# Patient Record
Sex: MALE | Race: BLACK OR AFRICAN AMERICAN | Employment: FULL TIME | URBAN - METROPOLITAN AREA
[De-identification: names, ages, dates, MRNs, and addresses within clinical notes are randomized per-mention and may not be internally consistent; named-entity substitution may affect disease eponyms.]

---

## 2023-08-23 ENCOUNTER — HOSPITAL ENCOUNTER (INPATIENT)
Age: 28
LOS: 4 days | Discharge: HOME | DRG: 241 | End: 2023-08-27
Attending: EMERGENCY MEDICINE | Admitting: FAMILY MEDICINE
Payer: MEDICAID

## 2023-08-23 DIAGNOSIS — E86.1 HYPOTENSION DUE TO HYPOVOLEMIA: ICD-10-CM

## 2023-08-23 DIAGNOSIS — R55 SYNCOPE AND COLLAPSE: ICD-10-CM

## 2023-08-23 DIAGNOSIS — K92.2 ACUTE UPPER GI BLEED: Primary | ICD-10-CM

## 2023-08-23 DIAGNOSIS — I95.89 HYPOTENSION DUE TO HYPOVOLEMIA: ICD-10-CM

## 2023-08-23 PROBLEM — K92.0 HEMATEMESIS: Status: ACTIVE | Noted: 2023-08-23

## 2023-08-23 PROBLEM — E66.01 CLASS 2 SEVERE OBESITY DUE TO EXCESS CALORIES WITH SERIOUS COMORBIDITY AND BODY MASS INDEX (BMI) OF 38.0 TO 38.9 IN ADULT (HCC): Chronic | Status: ACTIVE | Noted: 2023-08-23

## 2023-08-23 LAB
ABO + RH BLD: NORMAL
ALBUMIN SERPL-MCNC: 3.3 G/DL (ref 3.5–5)
ALBUMIN/GLOB SERPL: 0.9 (ref 0.4–1.6)
ALP SERPL-CCNC: 45 U/L (ref 50–136)
ALT SERPL-CCNC: 30 U/L (ref 12–65)
AMPHET UR QL SCN: NEGATIVE
ANION GAP SERPL CALC-SCNC: 6 MMOL/L (ref 2–11)
APTT PPP: 26.1 SEC (ref 24.5–34.2)
AST SERPL-CCNC: 14 U/L (ref 15–37)
BARBITURATES UR QL SCN: NEGATIVE
BASOPHILS # BLD: 0.1 K/UL (ref 0–0.2)
BASOPHILS NFR BLD: 0 % (ref 0–2)
BENZODIAZ UR QL: NEGATIVE
BILIRUB SERPL-MCNC: 0.6 MG/DL (ref 0.2–1.1)
BLOOD GROUP ANTIBODIES SERPL: NORMAL
BUN SERPL-MCNC: 37 MG/DL (ref 6–23)
CALCIUM SERPL-MCNC: 8.7 MG/DL (ref 8.3–10.4)
CANNABINOIDS UR QL SCN: POSITIVE
CHLORIDE SERPL-SCNC: 112 MMOL/L (ref 101–110)
CO2 SERPL-SCNC: 25 MMOL/L (ref 21–32)
COCAINE UR QL SCN: NEGATIVE
CREAT SERPL-MCNC: 1.21 MG/DL (ref 0.8–1.5)
DIFFERENTIAL METHOD BLD: ABNORMAL
EKG ATRIAL RATE: 84 BPM
EKG DIAGNOSIS: NORMAL
EKG P AXIS: 56 DEGREES
EKG P-R INTERVAL: 149 MS
EKG Q-T INTERVAL: 368 MS
EKG QRS DURATION: 91 MS
EKG QTC CALCULATION (BAZETT): 435 MS
EKG R AXIS: 39 DEGREES
EKG T AXIS: 54 DEGREES
EKG VENTRICULAR RATE: 84 BPM
EOSINOPHIL # BLD: 0.1 K/UL (ref 0–0.8)
EOSINOPHIL NFR BLD: 0 % (ref 0.5–7.8)
ERYTHROCYTE [DISTWIDTH] IN BLOOD BY AUTOMATED COUNT: 13.7 % (ref 11.9–14.6)
ETHANOL SERPL-MCNC: <3 MG/DL (ref 0–0.08)
GLOBULIN SER CALC-MCNC: 3.5 G/DL (ref 2.8–4.5)
GLUCOSE SERPL-MCNC: 131 MG/DL (ref 65–100)
HCT VFR BLD AUTO: 38.8 % (ref 41.1–50.3)
HGB BLD-MCNC: 13.2 G/DL (ref 13.6–17.2)
IMM GRANULOCYTES # BLD AUTO: 0.1 K/UL (ref 0–0.5)
IMM GRANULOCYTES NFR BLD AUTO: 1 % (ref 0–5)
INR PPP: 1.2
LACTATE SERPL-SCNC: 1.4 MMOL/L (ref 0.4–2)
LIPASE SERPL-CCNC: 140 U/L (ref 73–393)
LYMPHOCYTES # BLD: 2.7 K/UL (ref 0.5–4.6)
LYMPHOCYTES NFR BLD: 23 % (ref 13–44)
MCH RBC QN AUTO: 30.8 PG (ref 26.1–32.9)
MCHC RBC AUTO-ENTMCNC: 34 G/DL (ref 31.4–35)
MCV RBC AUTO: 90.7 FL (ref 82–102)
METHADONE UR QL: NEGATIVE
MONOCYTES # BLD: 0.8 K/UL (ref 0.1–1.3)
MONOCYTES NFR BLD: 7 % (ref 4–12)
NEUTS SEG # BLD: 8.4 K/UL (ref 1.7–8.2)
NEUTS SEG NFR BLD: 69 % (ref 43–78)
NRBC # BLD: 0 K/UL (ref 0–0.2)
OPIATES UR QL: NEGATIVE
PCP UR QL: NEGATIVE
PLATELET # BLD AUTO: 225 K/UL (ref 150–450)
PMV BLD AUTO: 10.7 FL (ref 9.4–12.3)
POTASSIUM SERPL-SCNC: 4.4 MMOL/L (ref 3.5–5.1)
PROT SERPL-MCNC: 6.8 G/DL (ref 6.3–8.2)
PROTHROMBIN TIME: 14.9 SEC (ref 12.6–14.3)
RBC # BLD AUTO: 4.28 M/UL (ref 4.23–5.6)
SODIUM SERPL-SCNC: 143 MMOL/L (ref 133–143)
SPECIMEN EXP DATE BLD: NORMAL
WBC # BLD AUTO: 12.1 K/UL (ref 4.3–11.1)

## 2023-08-23 PROCEDURE — 96361 HYDRATE IV INFUSION ADD-ON: CPT

## 2023-08-23 PROCEDURE — 85610 PROTHROMBIN TIME: CPT

## 2023-08-23 PROCEDURE — 87209 SMEAR COMPLEX STAIN: CPT

## 2023-08-23 PROCEDURE — 80053 COMPREHEN METABOLIC PANEL: CPT

## 2023-08-23 PROCEDURE — 2580000003 HC RX 258: Performed by: EMERGENCY MEDICINE

## 2023-08-23 PROCEDURE — 87046 STOOL CULTR AEROBIC BACT EA: CPT

## 2023-08-23 PROCEDURE — 87427 SHIGA-LIKE TOXIN AG IA: CPT

## 2023-08-23 PROCEDURE — 99221 1ST HOSP IP/OBS SF/LOW 40: CPT | Performed by: INTERNAL MEDICINE

## 2023-08-23 PROCEDURE — 2580000003 HC RX 258: Performed by: FAMILY MEDICINE

## 2023-08-23 PROCEDURE — 87077 CULTURE AEROBIC IDENTIFY: CPT

## 2023-08-23 PROCEDURE — 96375 TX/PRO/DX INJ NEW DRUG ADDON: CPT

## 2023-08-23 PROCEDURE — C9113 INJ PANTOPRAZOLE SODIUM, VIA: HCPCS | Performed by: EMERGENCY MEDICINE

## 2023-08-23 PROCEDURE — 87177 OVA AND PARASITES SMEARS: CPT

## 2023-08-23 PROCEDURE — 6360000002 HC RX W HCPCS: Performed by: INTERNAL MEDICINE

## 2023-08-23 PROCEDURE — A4216 STERILE WATER/SALINE, 10 ML: HCPCS | Performed by: INTERNAL MEDICINE

## 2023-08-23 PROCEDURE — 83605 ASSAY OF LACTIC ACID: CPT

## 2023-08-23 PROCEDURE — 2580000003 HC RX 258: Performed by: INTERNAL MEDICINE

## 2023-08-23 PROCEDURE — 94762 N-INVAS EAR/PLS OXIMTRY CONT: CPT

## 2023-08-23 PROCEDURE — A4216 STERILE WATER/SALINE, 10 ML: HCPCS | Performed by: EMERGENCY MEDICINE

## 2023-08-23 PROCEDURE — 6360000002 HC RX W HCPCS: Performed by: EMERGENCY MEDICINE

## 2023-08-23 PROCEDURE — 81001 URINALYSIS AUTO W/SCOPE: CPT

## 2023-08-23 PROCEDURE — 87324 CLOSTRIDIUM AG IA: CPT

## 2023-08-23 PROCEDURE — 85025 COMPLETE CBC W/AUTO DIFF WBC: CPT

## 2023-08-23 PROCEDURE — 86901 BLOOD TYPING SEROLOGIC RH(D): CPT

## 2023-08-23 PROCEDURE — 93005 ELECTROCARDIOGRAM TRACING: CPT | Performed by: EMERGENCY MEDICINE

## 2023-08-23 PROCEDURE — 85730 THROMBOPLASTIN TIME PARTIAL: CPT

## 2023-08-23 PROCEDURE — 80307 DRUG TEST PRSMV CHEM ANLYZR: CPT

## 2023-08-23 PROCEDURE — 87086 URINE CULTURE/COLONY COUNT: CPT

## 2023-08-23 PROCEDURE — C9113 INJ PANTOPRAZOLE SODIUM, VIA: HCPCS | Performed by: INTERNAL MEDICINE

## 2023-08-23 PROCEDURE — 96374 THER/PROPH/DIAG INJ IV PUSH: CPT

## 2023-08-23 PROCEDURE — 87899 AGENT NOS ASSAY W/OPTIC: CPT

## 2023-08-23 PROCEDURE — 86900 BLOOD TYPING SEROLOGIC ABO: CPT

## 2023-08-23 PROCEDURE — 6370000000 HC RX 637 (ALT 250 FOR IP): Performed by: FAMILY MEDICINE

## 2023-08-23 PROCEDURE — 87045 FECES CULTURE AEROBIC BACT: CPT

## 2023-08-23 PROCEDURE — 86850 RBC ANTIBODY SCREEN: CPT

## 2023-08-23 PROCEDURE — 1100000000 HC RM PRIVATE

## 2023-08-23 PROCEDURE — 83690 ASSAY OF LIPASE: CPT

## 2023-08-23 PROCEDURE — 99285 EMERGENCY DEPT VISIT HI MDM: CPT

## 2023-08-23 PROCEDURE — 82077 ASSAY SPEC XCP UR&BREATH IA: CPT

## 2023-08-23 PROCEDURE — 87449 NOS EACH ORGANISM AG IA: CPT

## 2023-08-23 RX ORDER — SUCRALFATE 1 G/1
1 TABLET ORAL EVERY 8 HOURS SCHEDULED
Status: DISCONTINUED | OUTPATIENT
Start: 2023-08-23 | End: 2023-08-27 | Stop reason: HOSPADM

## 2023-08-23 RX ORDER — ONDANSETRON 2 MG/ML
4 INJECTION INTRAMUSCULAR; INTRAVENOUS ONCE
Status: COMPLETED | OUTPATIENT
Start: 2023-08-23 | End: 2023-08-23

## 2023-08-23 RX ORDER — SODIUM CHLORIDE, SODIUM LACTATE, POTASSIUM CHLORIDE, CALCIUM CHLORIDE 600; 310; 30; 20 MG/100ML; MG/100ML; MG/100ML; MG/100ML
INJECTION, SOLUTION INTRAVENOUS CONTINUOUS
Status: ACTIVE | OUTPATIENT
Start: 2023-08-23 | End: 2023-08-24

## 2023-08-23 RX ORDER — 0.9 % SODIUM CHLORIDE 0.9 %
1000 INTRAVENOUS SOLUTION INTRAVENOUS ONCE
Status: COMPLETED | OUTPATIENT
Start: 2023-08-23 | End: 2023-08-23

## 2023-08-23 RX ORDER — SUCRALFATE 1 G/1
TABLET ORAL
Status: DISPENSED
Start: 2023-08-23 | End: 2023-08-24

## 2023-08-23 RX ORDER — SODIUM CHLORIDE, SODIUM LACTATE, POTASSIUM CHLORIDE, AND CALCIUM CHLORIDE .6; .31; .03; .02 G/100ML; G/100ML; G/100ML; G/100ML
500 INJECTION, SOLUTION INTRAVENOUS ONCE
Status: COMPLETED | OUTPATIENT
Start: 2023-08-23 | End: 2023-08-23

## 2023-08-23 RX ADMIN — SODIUM CHLORIDE 1000 ML: 9 INJECTION, SOLUTION INTRAVENOUS at 11:34

## 2023-08-23 RX ADMIN — SODIUM CHLORIDE, PRESERVATIVE FREE 40 MG: 5 INJECTION INTRAVENOUS at 21:45

## 2023-08-23 RX ADMIN — SUCRALFATE 1 G: 1 TABLET ORAL at 21:45

## 2023-08-23 RX ADMIN — SODIUM CHLORIDE, SODIUM LACTATE, POTASSIUM CHLORIDE, AND CALCIUM CHLORIDE 500 ML: 600; 310; 30; 20 INJECTION, SOLUTION INTRAVENOUS at 14:15

## 2023-08-23 RX ADMIN — SODIUM CHLORIDE, POTASSIUM CHLORIDE, SODIUM LACTATE AND CALCIUM CHLORIDE: 600; 310; 30; 20 INJECTION, SOLUTION INTRAVENOUS at 15:13

## 2023-08-23 RX ADMIN — SUCRALFATE 1 G: 1 TABLET ORAL at 16:37

## 2023-08-23 RX ADMIN — SODIUM CHLORIDE 40 MG: 9 INJECTION INTRAMUSCULAR; INTRAVENOUS; SUBCUTANEOUS at 11:35

## 2023-08-23 RX ADMIN — ONDANSETRON 4 MG: 2 INJECTION INTRAMUSCULAR; INTRAVENOUS at 11:35

## 2023-08-23 ASSESSMENT — ENCOUNTER SYMPTOMS
DIARRHEA: 0
SHORTNESS OF BREATH: 0
NAUSEA: 1
COUGH: 0
BLOOD IN STOOL: 1
VOMITING: 1
ABDOMINAL PAIN: 1
BACK PAIN: 0
SORE THROAT: 0

## 2023-08-23 ASSESSMENT — PAIN - FUNCTIONAL ASSESSMENT: PAIN_FUNCTIONAL_ASSESSMENT: 0-10

## 2023-08-23 ASSESSMENT — PAIN SCALES - GENERAL: PAINLEVEL_OUTOF10: 0

## 2023-08-23 NOTE — H&P
143 133 - 143 mmol/L    Potassium 4.4 3.5 - 5.1 mmol/L    Chloride 112 (H) 101 - 110 mmol/L    CO2 25 21 - 32 mmol/L    Anion Gap 6 2 - 11 mmol/L    Glucose 131 (H) 65 - 100 mg/dL    BUN 37 (H) 6 - 23 MG/DL    Creatinine 1.21 0.8 - 1.5 MG/DL    Est, Glom Filt Rate >60 >60 ml/min/1.73m2    Calcium 8.7 8.3 - 10.4 MG/DL    Total Bilirubin 0.6 0.2 - 1.1 MG/DL    ALT 30 12 - 65 U/L    AST 14 (L) 15 - 37 U/L    Alk Phosphatase 45 (L) 50 - 136 U/L    Total Protein 6.8 6.3 - 8.2 g/dL    Albumin 3.3 (L) 3.5 - 5.0 g/dL    Globulin 3.5 2.8 - 4.5 g/dL    Albumin/Globulin Ratio 0.9 0.4 - 1.6     Lipase    Collection Time: 08/23/23 11:17 AM   Result Value Ref Range    Lipase 140 73 - 393 U/L   APTT    Collection Time: 08/23/23 11:17 AM   Result Value Ref Range    PTT 26.1 24.5 - 34.2 SEC   Protime-INR    Collection Time: 08/23/23 11:17 AM   Result Value Ref Range    Protime 14.9 (H) 12.6 - 14.3 sec    INR 1.2     Ethanol    Collection Time: 08/23/23 11:17 AM   Result Value Ref Range    Ethanol Lvl <3 MG/DL   Lactate, Sepsis    Collection Time: 08/23/23 11:30 AM   Result Value Ref Range    Lactic Acid, Sepsis 1.4 0.4 - 2.0 MMOL/L   TYPE AND SCREEN    Collection Time: 08/23/23 11:30 AM   Result Value Ref Range    Crossmatch expiration date 08/26/2023,9489     ABO/Rh A POSITIVE     Antibody Screen NEG        I have personally reviewed imaging studies:  No results found.       Signed:  Trisha Caldera MD

## 2023-08-23 NOTE — ED PROVIDER NOTES
Urine Drug Screen    Ethanol    CBC    Diet NPO    ADULT DIET;  Regular    Cardiac Monitor - ED Only    Continuous Pulse Oximetry    Inpatient consult to GI    EKG 12 Lead    TYPE AND SCREEN    Saline lock IV    ADMIT TO INPATIENT       Critical Care  Performed by: Natalia Jean MD  Authorized by: Wanda Hylton MD     Critical care provider statement:     Critical care time (minutes):  32    Critical care time was exclusive of:  Separately billable procedures and treating other patients    Critical care was necessary to treat or prevent imminent or life-threatening deterioration of the following conditions:  Circulatory failure and shock    Critical care was time spent personally by me on the following activities:  Development of treatment plan with patient or surrogate, discussions with consultants, discussions with primary provider, evaluation of patient's response to treatment, examination of patient, obtaining history from patient or surrogate, ordering and review of laboratory studies, ordering and performing treatments and interventions and re-evaluation of patient's condition    I assumed direction of critical care for this patient from another provider in my specialty: no      Care discussed with: admitting provider      Results Include:    Recent Results (from the past 24 hour(s))   EKG 12 Lead    Collection Time: 08/23/23 11:15 AM   Result Value Ref Range    Ventricular Rate 84 BPM    Atrial Rate 84 BPM    P-R Interval 149 ms    QRS Duration 91 ms    Q-T Interval 368 ms    QTc Calculation (Bazett) 435 ms    P Axis 56 degrees    R Axis 39 degrees    T Axis 54 degrees    Diagnosis       Sinus rhythm  Normal ECG    Confirmed by MD JONNY, Corby Hart ([id number]) on 8/23/2023 1:33:13 PM     CBC with Auto Differential    Collection Time: 08/23/23 11:17 AM   Result Value Ref Range    WBC 12.1 (H) 4.3 - 11.1 K/uL    RBC 4.28 4.23 - 5.6 M/uL    Hemoglobin 13.2 (L) 13.6 - 17.2 g/dL    Hematocrit 38.8 (L) 41.1 - 50.3 %

## 2023-08-23 NOTE — ED TRIAGE NOTES
Patient brought in by EMS coming from home. EMS states that patient had LOC this morning at 8 am. States emesis with black blood clots. EMS states patient also had black tarry stools this morning. NS given through L AC, 200 Ml. With EMS patient became lethargic.      70/42 L arm BP  150/70 R arm

## 2023-08-23 NOTE — ED NOTES
TRANSFER - OUT REPORT:    Verbal report given to Celina on Burnstephanie Batista  being transferred to [address] [place] [address] Drive [phone]) for routine progression of patient care       Report consisted of patient's Situation, Background, Assessment and   Recommendations(SBAR). Information from the following report(s) Nurse Handoff Report was reviewed with the receiving nurse. Kinder Fall Assessment:                           Lines:   Peripheral IV 08/23/23 Left Antecubital (Active)        Opportunity for questions and clarification was provided.       Patient transported with:  Patient transporter          Rajendra Rodgers RN  08/23/23 3198

## 2023-08-24 ENCOUNTER — ANESTHESIA EVENT (OUTPATIENT)
Dept: ENDOSCOPY | Age: 28
End: 2023-08-24
Payer: MEDICAID

## 2023-08-24 ENCOUNTER — ANESTHESIA (OUTPATIENT)
Dept: ENDOSCOPY | Age: 28
End: 2023-08-24
Payer: MEDICAID

## 2023-08-24 PROBLEM — K92.2 ACUTE UPPER GI BLEED: Status: ACTIVE | Noted: 2023-08-24

## 2023-08-24 LAB
APPEARANCE UR: CLEAR
BACTERIA URNS QL MICRO: NEGATIVE /HPF
BILIRUB UR QL: NEGATIVE
CASTS URNS QL MICRO: ABNORMAL /LPF
COLOR UR: ABNORMAL
EPI CELLS #/AREA URNS HPF: ABNORMAL /HPF
ERYTHROCYTE [DISTWIDTH] IN BLOOD BY AUTOMATED COUNT: 13.9 % (ref 11.9–14.6)
GLUCOSE UR STRIP.AUTO-MCNC: NEGATIVE MG/DL
HCT VFR BLD AUTO: 29.9 % (ref 41.1–50.3)
HGB BLD-MCNC: 10 G/DL (ref 13.6–17.2)
HGB UR QL STRIP: NEGATIVE
KETONES UR QL STRIP.AUTO: NEGATIVE MG/DL
LEUKOCYTE ESTERASE UR QL STRIP.AUTO: ABNORMAL
MCH RBC QN AUTO: 30.8 PG (ref 26.1–32.9)
MCHC RBC AUTO-ENTMCNC: 33.4 G/DL (ref 31.4–35)
MCV RBC AUTO: 92 FL (ref 82–102)
MUCOUS THREADS URNS QL MICRO: 0 /LPF
NITRITE UR QL STRIP.AUTO: NEGATIVE
NRBC # BLD: 0 K/UL (ref 0–0.2)
PH UR STRIP: 6.5 (ref 5–9)
PLATELET # BLD AUTO: 184 K/UL (ref 150–450)
PMV BLD AUTO: 11 FL (ref 9.4–12.3)
PROT UR STRIP-MCNC: NEGATIVE MG/DL
RBC # BLD AUTO: 3.25 M/UL (ref 4.23–5.6)
RBC #/AREA URNS HPF: ABNORMAL /HPF
SP GR UR REFRACTOMETRY: 1.03 (ref 1–1.02)
URINE CULTURE IF INDICATED: ABNORMAL
UROBILINOGEN UR QL STRIP.AUTO: 0.2 EU/DL (ref 0.2–1)
WBC # BLD AUTO: 8.7 K/UL (ref 4.3–11.1)
WBC URNS QL MICRO: ABNORMAL /HPF

## 2023-08-24 PROCEDURE — 6360000002 HC RX W HCPCS: Performed by: NURSE ANESTHETIST, CERTIFIED REGISTERED

## 2023-08-24 PROCEDURE — 2709999900 HC NON-CHARGEABLE SUPPLY: Performed by: INTERNAL MEDICINE

## 2023-08-24 PROCEDURE — 6360000002 HC RX W HCPCS: Performed by: FAMILY MEDICINE

## 2023-08-24 PROCEDURE — 2500000003 HC RX 250 WO HCPCS: Performed by: NURSE ANESTHETIST, CERTIFIED REGISTERED

## 2023-08-24 PROCEDURE — 2580000003 HC RX 258: Performed by: INTERNAL MEDICINE

## 2023-08-24 PROCEDURE — 85027 COMPLETE CBC AUTOMATED: CPT

## 2023-08-24 PROCEDURE — A4216 STERILE WATER/SALINE, 10 ML: HCPCS | Performed by: INTERNAL MEDICINE

## 2023-08-24 PROCEDURE — 3E0G8GC INTRODUCTION OF OTHER THERAPEUTIC SUBSTANCE INTO UPPER GI, VIA NATURAL OR ARTIFICIAL OPENING ENDOSCOPIC: ICD-10-PCS | Performed by: INTERNAL MEDICINE

## 2023-08-24 PROCEDURE — 6370000000 HC RX 637 (ALT 250 FOR IP): Performed by: FAMILY MEDICINE

## 2023-08-24 PROCEDURE — C9113 INJ PANTOPRAZOLE SODIUM, VIA: HCPCS | Performed by: INTERNAL MEDICINE

## 2023-08-24 PROCEDURE — 2720000010 HC SURG SUPPLY STERILE: Performed by: INTERNAL MEDICINE

## 2023-08-24 PROCEDURE — 88305 TISSUE EXAM BY PATHOLOGIST: CPT

## 2023-08-24 PROCEDURE — 3700000001 HC ADD 15 MINUTES (ANESTHESIA): Performed by: INTERNAL MEDICINE

## 2023-08-24 PROCEDURE — 6360000002 HC RX W HCPCS: Performed by: INTERNAL MEDICINE

## 2023-08-24 PROCEDURE — 2580000003 HC RX 258: Performed by: FAMILY MEDICINE

## 2023-08-24 PROCEDURE — C1889 IMPLANT/INSERT DEVICE, NOC: HCPCS | Performed by: INTERNAL MEDICINE

## 2023-08-24 PROCEDURE — 7100000000 HC PACU RECOVERY - FIRST 15 MIN: Performed by: INTERNAL MEDICINE

## 2023-08-24 PROCEDURE — 3700000000 HC ANESTHESIA ATTENDED CARE: Performed by: INTERNAL MEDICINE

## 2023-08-24 PROCEDURE — 2000000000 HC ICU R&B

## 2023-08-24 PROCEDURE — 3609012300 HC EGD BAND LIGATION ESOPHGEAL/GASTRIC VARICES: Performed by: INTERNAL MEDICINE

## 2023-08-24 PROCEDURE — 0W3P8ZZ CONTROL BLEEDING IN GASTROINTESTINAL TRACT, VIA NATURAL OR ARTIFICIAL OPENING ENDOSCOPIC: ICD-10-PCS | Performed by: INTERNAL MEDICINE

## 2023-08-24 PROCEDURE — 36415 COLL VENOUS BLD VENIPUNCTURE: CPT

## 2023-08-24 PROCEDURE — 7100000001 HC PACU RECOVERY - ADDTL 15 MIN: Performed by: INTERNAL MEDICINE

## 2023-08-24 PROCEDURE — 88312 SPECIAL STAINS GROUP 1: CPT

## 2023-08-24 RX ORDER — PROPOFOL 10 MG/ML
INJECTION, EMULSION INTRAVENOUS PRN
Status: DISCONTINUED | OUTPATIENT
Start: 2023-08-24 | End: 2023-08-24 | Stop reason: SDUPTHER

## 2023-08-24 RX ORDER — ENOXAPARIN SODIUM 100 MG/ML
30 INJECTION SUBCUTANEOUS 2 TIMES DAILY
Status: DISCONTINUED | OUTPATIENT
Start: 2023-08-24 | End: 2023-08-27 | Stop reason: HOSPADM

## 2023-08-24 RX ORDER — ONDANSETRON 4 MG/1
4 TABLET, ORALLY DISINTEGRATING ORAL EVERY 8 HOURS PRN
Status: DISCONTINUED | OUTPATIENT
Start: 2023-08-24 | End: 2023-08-27 | Stop reason: HOSPADM

## 2023-08-24 RX ORDER — SODIUM CHLORIDE 0.9 % (FLUSH) 0.9 %
5-40 SYRINGE (ML) INJECTION EVERY 12 HOURS SCHEDULED
Status: DISCONTINUED | OUTPATIENT
Start: 2023-08-24 | End: 2023-08-27 | Stop reason: HOSPADM

## 2023-08-24 RX ORDER — ACETAMINOPHEN 650 MG/1
650 SUPPOSITORY RECTAL EVERY 6 HOURS PRN
Status: DISCONTINUED | OUTPATIENT
Start: 2023-08-24 | End: 2023-08-27 | Stop reason: HOSPADM

## 2023-08-24 RX ORDER — ACETAMINOPHEN 325 MG/1
650 TABLET ORAL EVERY 6 HOURS PRN
Status: DISCONTINUED | OUTPATIENT
Start: 2023-08-24 | End: 2023-08-27 | Stop reason: HOSPADM

## 2023-08-24 RX ORDER — POTASSIUM CHLORIDE 7.45 MG/ML
10 INJECTION INTRAVENOUS PRN
Status: DISCONTINUED | OUTPATIENT
Start: 2023-08-24 | End: 2023-08-27 | Stop reason: HOSPADM

## 2023-08-24 RX ORDER — ONDANSETRON 2 MG/ML
INJECTION INTRAMUSCULAR; INTRAVENOUS PRN
Status: DISCONTINUED | OUTPATIENT
Start: 2023-08-24 | End: 2023-08-24 | Stop reason: SDUPTHER

## 2023-08-24 RX ORDER — SODIUM CHLORIDE 9 MG/ML
INJECTION, SOLUTION INTRAVENOUS PRN
Status: DISCONTINUED | OUTPATIENT
Start: 2023-08-24 | End: 2023-08-27 | Stop reason: HOSPADM

## 2023-08-24 RX ORDER — FENTANYL CITRATE 50 UG/ML
INJECTION, SOLUTION INTRAMUSCULAR; INTRAVENOUS PRN
Status: DISCONTINUED | OUTPATIENT
Start: 2023-08-24 | End: 2023-08-24 | Stop reason: SDUPTHER

## 2023-08-24 RX ORDER — ONDANSETRON 2 MG/ML
4 INJECTION INTRAMUSCULAR; INTRAVENOUS EVERY 6 HOURS PRN
Status: DISCONTINUED | OUTPATIENT
Start: 2023-08-24 | End: 2023-08-27 | Stop reason: HOSPADM

## 2023-08-24 RX ORDER — POLYETHYLENE GLYCOL 3350 17 G/17G
17 POWDER, FOR SOLUTION ORAL DAILY PRN
Status: DISCONTINUED | OUTPATIENT
Start: 2023-08-24 | End: 2023-08-27 | Stop reason: HOSPADM

## 2023-08-24 RX ORDER — POTASSIUM CHLORIDE 20 MEQ/1
40 TABLET, EXTENDED RELEASE ORAL PRN
Status: DISCONTINUED | OUTPATIENT
Start: 2023-08-24 | End: 2023-08-27 | Stop reason: HOSPADM

## 2023-08-24 RX ORDER — SODIUM CHLORIDE 0.9 % (FLUSH) 0.9 %
5-40 SYRINGE (ML) INJECTION PRN
Status: DISCONTINUED | OUTPATIENT
Start: 2023-08-24 | End: 2023-08-27 | Stop reason: HOSPADM

## 2023-08-24 RX ORDER — LIDOCAINE HYDROCHLORIDE 20 MG/ML
INJECTION, SOLUTION EPIDURAL; INFILTRATION; INTRACAUDAL; PERINEURAL PRN
Status: DISCONTINUED | OUTPATIENT
Start: 2023-08-24 | End: 2023-08-24 | Stop reason: SDUPTHER

## 2023-08-24 RX ORDER — EPINEPHRINE 1 MG/ML(1)
AMPUL (ML) INJECTION PRN
Status: DISCONTINUED | OUTPATIENT
Start: 2023-08-24 | End: 2023-08-24 | Stop reason: ALTCHOICE

## 2023-08-24 RX ORDER — MAGNESIUM SULFATE IN WATER 40 MG/ML
2000 INJECTION, SOLUTION INTRAVENOUS PRN
Status: DISCONTINUED | OUTPATIENT
Start: 2023-08-24 | End: 2023-08-27 | Stop reason: HOSPADM

## 2023-08-24 RX ADMIN — SUCRALFATE 1 G: 1 TABLET ORAL at 21:57

## 2023-08-24 RX ADMIN — SODIUM CHLORIDE, PRESERVATIVE FREE 40 MG: 5 INJECTION INTRAVENOUS at 23:33

## 2023-08-24 RX ADMIN — ONDANSETRON 4 MG: 2 INJECTION INTRAMUSCULAR; INTRAVENOUS at 11:02

## 2023-08-24 RX ADMIN — PROPOFOL 50 MG: 10 INJECTION, EMULSION INTRAVENOUS at 10:37

## 2023-08-24 RX ADMIN — PROPOFOL 100 MCG/KG/MIN: 10 INJECTION, EMULSION INTRAVENOUS at 10:38

## 2023-08-24 RX ADMIN — PHENYLEPHRINE HYDROCHLORIDE 50 MCG: 0.1 INJECTION, SOLUTION INTRAVENOUS at 10:52

## 2023-08-24 RX ADMIN — SUCRALFATE 1 G: 1 TABLET ORAL at 14:58

## 2023-08-24 RX ADMIN — VANCOMYCIN HYDROCHLORIDE 125 MG: 5 INJECTION, POWDER, LYOPHILIZED, FOR SOLUTION INTRAVENOUS at 15:32

## 2023-08-24 RX ADMIN — SODIUM CHLORIDE, POTASSIUM CHLORIDE, SODIUM LACTATE AND CALCIUM CHLORIDE: 600; 310; 30; 20 INJECTION, SOLUTION INTRAVENOUS at 01:32

## 2023-08-24 RX ADMIN — SODIUM CHLORIDE, PRESERVATIVE FREE 40 MG: 5 INJECTION INTRAVENOUS at 12:39

## 2023-08-24 RX ADMIN — LIDOCAINE HYDROCHLORIDE 100 MG: 20 INJECTION, SOLUTION EPIDURAL; INFILTRATION; INTRACAUDAL; PERINEURAL at 10:42

## 2023-08-24 RX ADMIN — SODIUM CHLORIDE, PRESERVATIVE FREE 5 ML: 5 INJECTION INTRAVENOUS at 20:57

## 2023-08-24 RX ADMIN — FENTANYL CITRATE 50 MCG: 50 INJECTION, SOLUTION INTRAMUSCULAR; INTRAVENOUS at 10:44

## 2023-08-24 RX ADMIN — PHENYLEPHRINE HYDROCHLORIDE 50 MCG: 0.1 INJECTION, SOLUTION INTRAVENOUS at 10:56

## 2023-08-24 RX ADMIN — VANCOMYCIN HYDROCHLORIDE 125 MG: 5 INJECTION, POWDER, LYOPHILIZED, FOR SOLUTION INTRAVENOUS at 23:34

## 2023-08-24 RX ADMIN — FENTANYL CITRATE 50 MCG: 50 INJECTION, SOLUTION INTRAMUSCULAR; INTRAVENOUS at 10:45

## 2023-08-24 ASSESSMENT — PAIN SCALES - GENERAL
PAINLEVEL_OUTOF10: 0

## 2023-08-24 ASSESSMENT — LIFESTYLE VARIABLES: SMOKING_STATUS: 1

## 2023-08-24 NOTE — PERIOP NOTE
Dr. Dario Rhodes would like the patient to go to ICU for observation. Dr. Naomie Holland notified. Dr. Naomie Holland to contact Dr. Dario Rhodes. Dr. Naomei Holland to place the transfer order to ICU. Nursing supervisor notified and bed request placed.

## 2023-08-24 NOTE — PROGRESS NOTES
Hospitalist Progress Note   Admit Date:  2023 11:12 AM   Name:  Elías Reardon   Age:  28 y.o. Sex:  male  :  1995   MRN:  [id number]   Room:  ENDO/PL    Presenting/Chief Complaint: Loss of Consciousness and Hematemesis     Reason(s) for Admission: Hematemesis [K92.0]  Syncope and collapse [R55]  Acute upper GI bleed [K92.2]  Hypotension due to hypovolemia [I95.89, E86.1]     Hospital Course:   28 y.o. male with medical history of no significance who presented with awakened with rapid onset diarrhea and vomiting. He had eaten at [facility] and cook meals at home on the prior day. He awoke in feeling diaphoretic went to the bathroom was unable to make it to the toilet before he passed out and soiled himself. He noted bleeding with the vomiting. He does have a history of anterior approach surgery on the spine. He reports generalized resolution of diarrhea but feels very weak and tired. Subjective & 24hr Events:   Had EGD today. Discussed with gastroenterologist.  Considerable risk due to exposure blood vessel at the base of gastric erosion. H. pylori testing sent. Patient needs to remain in stepdown unit for 24 hours and then can be safely transferred to the floor but will need to be watched for at least 2 days. Patient feeling a little bit weak but overall better since admission. Interested in eating. Started clear liquid diet. Will monitor hemoglobin levels closely. Also notable C. difficile labs came back positive and patient was initiated on oral vancomycin. Plan discussed with patient's sister, mother and nurse, .     Assessment & Plan:   Hematemesis, gastric ulcer (high risk)  EGD with gastric ulcer above large vessel  -Consult gastroenterology  -Monitor in stepdown 24 hours  -Serial Hgb  -H. pylori labs  -Symptomatic management    C. difficile infection   C. difficile labs positive, diarrhea less today  -Vancomycin p.o. x7d    Class 2 severe obesity due to

## 2023-08-24 NOTE — ANESTHESIA POSTPROCEDURE EVALUATION
Department of Anesthesiology  Postprocedure Note    Patient: Stan Otto  MRN: [id number]  YOB: 1995  Date of evaluation: 8/24/2023      Procedure Summary     Date: 08/24/23 Room / Location: [facility] 01 / [facility] ENDOSCOPY    Anesthesia Start: 1025 Anesthesia Stop: 2768    Procedure: EGD BAND LIGATION/Bicap/injection/bxs (Upper GI Region) Diagnosis:       Hematemesis      (Hematemesis [K92.0])    Surgeons: Pio Schultz MD Responsible Provider: Angel Hinds MD    Anesthesia Type: TIVA ASA Status: 2          Anesthesia Type: No value filed.     Danyel Phase I: Danyel Score: 8    Danyel Phase II:        Anesthesia Post Evaluation    Patient location during evaluation: bedside  Patient participation: complete - patient participated  Level of consciousness: awake and alert  Airway patency: patent  Nausea & Vomiting: no vomiting  Complications: no  Cardiovascular status: hemodynamically stable  Respiratory status: acceptable  Hydration status: euvolemic  Pain management: adequate

## 2023-08-24 NOTE — PROGRESS NOTES
Hemoglobin dropped to 10 overnight. Will plan for diagnostic EGD this morning with Dr. Andre Reyes. Keep Npo.

## 2023-08-24 NOTE — OP NOTE
Endoscopy Note          Operative Report    Patient: Shahid Mendoza MRN: [id number]      YOB: 1995  Age: 28 y.o. Sex: male            Indications:  Hematemesis    Preoperative Evaluation: The patient was evaluated prior to the procedure in the GI lab admission area, the patient ASA was recorded . Consent was obtained from the patient with the risk of perforation bleeding and aspiration. Anesthesia: JAMES-per anesthesia    Complication: None; patient tolerated the procedure well. Estimated blood loss: insignificant    Procedure: The patient was sedated into the left lateral decubitus position. Scope was advanced from the mouth to the third portion of duodenum. Scope was withdrawn to the stomach and retroflexed view was performed. Esophageal examination was performed. Findings:    #1 antral ulcer with visible vessel status post epinephrine injection, gold probe cautery, and placement of 5 endoclips    2. Pinch biopsy taken of the gastric cavity work-up for possible H. pylori    Postoperative Diagnosis:    See above          Recommendations:    #1  Continue twice daily PPI    2. Based on the findings of an ulcer with visible vessel patient should be observed in a stepdown or ICU setting for the next 24 hours    3. Patient should remain in the hospital for approximately 3 days for close clinical observation    #4  Patient will need repeat upper endoscopy in 4 to 6 weeks    6. Follow-up biopsies to work-up for possible H. pylori    7. Avoid all nonsteroidal anti-inflammatory drugs as well as aspirin    8. Would initiate a clear liquid diet today    9. Serial H&H's and transfuse hemoglobin if less than 7.       Signed By:  Lea Dinh MD     August 24, 2023                     Operative Note      Patient: Shahid Mendoza  YOB: 1995  MRN: [id number]    Date of Procedure: 8/24/2023    Pre-Op Diagnosis Codes:     * Hematemesis [K92.0]    Post-Op Diagnosis: Same

## 2023-08-24 NOTE — PROGRESS NOTES
TRANSFER - IN REPORT:    Verbal report received from [place], [place]  on [employer]  being received from PACU for change in patient condition (s/p band ligation with visible vessels requiring 24h ICU observation )      Report consisted of patient's Situation, Background, Assessment and   Recommendations(SBAR). Information from the following report(s) Nurse Handoff Report, ED Encounter Summary, ED SBAR, Surgery Report, Intake/Output, Recent Results, and Cardiac Rhythm SR  was reviewed with the receiving nurse. Opportunity for questions and clarification was provided. Assessment completed upon patient's arrival to unit and care assumed.

## 2023-08-24 NOTE — PERIOP NOTE
TRANSFER - OUT REPORT:    Verbal report given to Marilou Duran on [place]  being transferred to [id number] for routine post-op       Report consisted of patient's Situation, Background, Assessment and   Recommendations(SBAR). Information from the following report(s) Nurse Handoff Report was reviewed with the receiving nurse. Lines:   Peripheral IV 08/23/23 Left Antecubital (Active)   Site Assessment Clean, dry & intact 08/24/23 1125   Line Status Capped;Flushed;Normal saline locked 08/24/23 1125   Line Care Connections checked and tightened 08/24/23 1125   Phlebitis Assessment No symptoms 08/24/23 1125   Infiltration Assessment 0 08/24/23 1125   Alcohol Cap Used No 08/24/23 1125   Dressing Status Clean, dry & intact 08/24/23 1125   Dressing Type Transparent 08/24/23 1125        Opportunity for questions and clarification was provided.       Patient transported with:  O2 @ room air lpm

## 2023-08-25 ENCOUNTER — APPOINTMENT (OUTPATIENT)
Dept: GENERAL RADIOLOGY | Age: 28
DRG: 241 | End: 2023-08-25
Payer: MEDICAID

## 2023-08-25 PROBLEM — D62 ACUTE BLOOD LOSS ANEMIA: Status: ACTIVE | Noted: 2023-08-25

## 2023-08-25 PROBLEM — A49.8 CLOSTRIDIOIDES DIFFICILE INFECTION: Status: ACTIVE | Noted: 2023-08-25

## 2023-08-25 LAB
ALBUMIN SERPL-MCNC: 2.8 G/DL (ref 3.5–5)
ANION GAP SERPL CALC-SCNC: 4 MMOL/L (ref 2–11)
BUN SERPL-MCNC: 17 MG/DL (ref 6–23)
CALCIUM SERPL-MCNC: 8.2 MG/DL (ref 8.3–10.4)
CHLORIDE SERPL-SCNC: 110 MMOL/L (ref 101–110)
CHOLEST SERPL-MCNC: 81 MG/DL
CO2 SERPL-SCNC: 28 MMOL/L (ref 21–32)
CREAT SERPL-MCNC: 0.91 MG/DL (ref 0.8–1.5)
ERYTHROCYTE [DISTWIDTH] IN BLOOD BY AUTOMATED COUNT: 13.9 % (ref 11.9–14.6)
EST. AVERAGE GLUCOSE BLD GHB EST-MCNC: 114 MG/DL
GLUCOSE SERPL-MCNC: 90 MG/DL (ref 65–100)
HBA1C MFR BLD: 5.6 % (ref 4.8–5.6)
HCT VFR BLD AUTO: 26 % (ref 41.1–50.3)
HCT VFR BLD AUTO: 27.1 % (ref 41.1–50.3)
HDLC SERPL-MCNC: 26 MG/DL (ref 40–60)
HDLC SERPL: 3.1
HGB BLD-MCNC: 8.6 G/DL (ref 13.6–17.2)
HGB BLD-MCNC: 8.9 G/DL (ref 13.6–17.2)
LDLC SERPL CALC-MCNC: 35.8 MG/DL
MCH RBC QN AUTO: 30.9 PG (ref 26.1–32.9)
MCHC RBC AUTO-ENTMCNC: 33.1 G/DL (ref 31.4–35)
MCV RBC AUTO: 93.5 FL (ref 82–102)
NRBC # BLD: 0 K/UL (ref 0–0.2)
PHOSPHATE SERPL-MCNC: 2.6 MG/DL (ref 2.5–4.5)
PLATELET # BLD AUTO: 171 K/UL (ref 150–450)
PMV BLD AUTO: 10.8 FL (ref 9.4–12.3)
POTASSIUM SERPL-SCNC: 3.6 MMOL/L (ref 3.5–5.1)
PROCALCITONIN SERPL-MCNC: <0.05 NG/ML (ref 0–0.49)
RBC # BLD AUTO: 2.78 M/UL (ref 4.23–5.6)
SODIUM SERPL-SCNC: 142 MMOL/L (ref 133–143)
TRIGL SERPL-MCNC: 96 MG/DL (ref 35–150)
TSH W FREE THYROID IF ABNORMAL: 0.95 UIU/ML (ref 0.36–3.74)
VLDLC SERPL CALC-MCNC: 19.2 MG/DL (ref 6–23)
WBC # BLD AUTO: 7.2 K/UL (ref 4.3–11.1)

## 2023-08-25 PROCEDURE — 6360000002 HC RX W HCPCS: Performed by: FAMILY MEDICINE

## 2023-08-25 PROCEDURE — 80061 LIPID PANEL: CPT

## 2023-08-25 PROCEDURE — A4216 STERILE WATER/SALINE, 10 ML: HCPCS | Performed by: INTERNAL MEDICINE

## 2023-08-25 PROCEDURE — 82040 ASSAY OF SERUM ALBUMIN: CPT

## 2023-08-25 PROCEDURE — 85027 COMPLETE CBC AUTOMATED: CPT

## 2023-08-25 PROCEDURE — 84145 PROCALCITONIN (PCT): CPT

## 2023-08-25 PROCEDURE — 84443 ASSAY THYROID STIM HORMONE: CPT

## 2023-08-25 PROCEDURE — 1100000000 HC RM PRIVATE

## 2023-08-25 PROCEDURE — C9113 INJ PANTOPRAZOLE SODIUM, VIA: HCPCS | Performed by: INTERNAL MEDICINE

## 2023-08-25 PROCEDURE — 6360000002 HC RX W HCPCS: Performed by: INTERNAL MEDICINE

## 2023-08-25 PROCEDURE — 6370000000 HC RX 637 (ALT 250 FOR IP): Performed by: FAMILY MEDICINE

## 2023-08-25 PROCEDURE — 2580000003 HC RX 258: Performed by: FAMILY MEDICINE

## 2023-08-25 PROCEDURE — 74018 RADEX ABDOMEN 1 VIEW: CPT

## 2023-08-25 PROCEDURE — 84100 ASSAY OF PHOSPHORUS: CPT

## 2023-08-25 PROCEDURE — 36415 COLL VENOUS BLD VENIPUNCTURE: CPT

## 2023-08-25 PROCEDURE — 83036 HEMOGLOBIN GLYCOSYLATED A1C: CPT

## 2023-08-25 PROCEDURE — 85018 HEMOGLOBIN: CPT

## 2023-08-25 PROCEDURE — 85014 HEMATOCRIT: CPT

## 2023-08-25 PROCEDURE — 80048 BASIC METABOLIC PNL TOTAL CA: CPT

## 2023-08-25 PROCEDURE — 2580000003 HC RX 258: Performed by: INTERNAL MEDICINE

## 2023-08-25 RX ADMIN — VANCOMYCIN HYDROCHLORIDE 125 MG: 5 INJECTION, POWDER, LYOPHILIZED, FOR SOLUTION INTRAVENOUS at 05:38

## 2023-08-25 RX ADMIN — SODIUM CHLORIDE, PRESERVATIVE FREE 10 ML: 5 INJECTION INTRAVENOUS at 09:42

## 2023-08-25 RX ADMIN — SUCRALFATE 1 G: 1 TABLET ORAL at 14:59

## 2023-08-25 RX ADMIN — SUCRALFATE 1 G: 1 TABLET ORAL at 05:38

## 2023-08-25 RX ADMIN — SODIUM CHLORIDE, PRESERVATIVE FREE 40 MG: 5 INJECTION INTRAVENOUS at 22:02

## 2023-08-25 RX ADMIN — SODIUM CHLORIDE, PRESERVATIVE FREE 40 MG: 5 INJECTION INTRAVENOUS at 11:18

## 2023-08-25 RX ADMIN — VANCOMYCIN HYDROCHLORIDE 125 MG: 5 INJECTION, POWDER, LYOPHILIZED, FOR SOLUTION INTRAVENOUS at 12:02

## 2023-08-25 RX ADMIN — VANCOMYCIN HYDROCHLORIDE 125 MG: 5 INJECTION, POWDER, LYOPHILIZED, FOR SOLUTION INTRAVENOUS at 18:16

## 2023-08-25 RX ADMIN — SODIUM CHLORIDE, PRESERVATIVE FREE 10 ML: 5 INJECTION INTRAVENOUS at 21:51

## 2023-08-25 RX ADMIN — SUCRALFATE 1 G: 1 TABLET ORAL at 21:50

## 2023-08-25 ASSESSMENT — PAIN SCALES - GENERAL: PAINLEVEL_OUTOF10: 0

## 2023-08-25 NOTE — PROGRESS NOTES
Patient seen and examined at bedside today. He seems to be in good health. There was concern for an additional drop in hemoglobin to 8. Recheck today shows stability. He denies any rectal bleeding while here. No bowel movement overnight. No abdominal pain reported. Recommendations:  Okay to advance diet as tolerated. No plans for further endoscopic intervention at this time. I would like him to stay in the hospital for 72 hours (total) while receiving pantoprazole 40 mg IV twice daily. Being that his ulcer was high risk for rebleeding. Ensure hemoglobin stability tomorrow. If there are any signs for further drop or any melena, please contact our service to evaluate for endoscopy reconsideration. But at this time I do not feel that he is continuing to bleed. He will need to be discharged home on pantoprazole 40 mg daily for 12 weeks. He is to avoid NSAID use as this can cause further ulcer disease. Plan discussed with the patient and his hospitalist, Dr. Zenaida Arguello.

## 2023-08-25 NOTE — PLAN OF CARE
Problem: Discharge Planning  Goal: Discharge to home or other facility with appropriate resources  Outcome: Progressing  Flowsheets (Taken 8/25/2023 0800)  Discharge to home or other facility with appropriate resources:   Identify barriers to discharge with patient and caregiver   Arrange for needed discharge resources and transportation as appropriate     Problem: Pain  Goal: Verbalizes/displays adequate comfort level or baseline comfort level  Outcome: Progressing     Problem: Safety - Adult  Goal: Free from fall injury  Outcome: Progressing     Problem: ABCDS Injury Assessment  Goal: Absence of physical injury  Outcome: Progressing

## 2023-08-25 NOTE — PROGRESS NOTES
Patient had first BM in two days per report. It was a medium amount of dark/firm stool. MD notified. No new orders.

## 2023-08-25 NOTE — PROGRESS NOTES
Hospitalist Progress Note   Admit Date:  2023 11:12 AM   Name:  Steven Puri   Age:  28 y.o. Sex:  male  :  1995   MRN:  [id number]   Room:  [facility]/    Presenting/Chief Complaint: Loss of Consciousness and Hematemesis     Reason(s) for Admission: Hematemesis [K92.0]  Syncope and collapse [R55]  Acute upper GI bleed [K92.2]  Hypotension due to hypovolemia [I95.89, E86.1]     Hospital Course:   28 y.o. male with medical history of no significance who presented with awakened with rapid onset diarrhea and vomiting. He had eaten at [facility] and cook meals at home on the prior day. He awoke in feeling diaphoretic went to the bathroom was unable to make it to the toilet before he passed out and soiled himself. He noted bleeding with the vomiting. He does have a history of anterior approach surgery on the spine. He reports generalized resolution of diarrhea but feels very weak and tired. Subjective & 24hr Events:   Another drop in hemoglobin overnight with no evidence of johnathan bleeding. No bowel movement. Question C. difficile infection in the absence of diarrhea. Discussed with gastroenterology, agree safe to resume diet, only concern for C. difficile would be with obstruction or ileus. KUB ordered. Recheck of hemoglobin stable. Remains anemic. Consented for transfusion if needed. Plan discussed with nurse, . Anticipate discharge 48 to 72 hours.     Assessment & Plan:   Hematemesis, gastric ulcer (high risk)  EGD with gastric ulcer above large vessel  -Consult gastroenterology  -Monitor in stepdown 24 hours  -Serial Hgb  -H. pylori labs  -Symptomatic management  2023: Hgb down again but stable on recheck    Acute blood loss anemia  -Transfuse Hgb < 7    C. difficile infection   C. difficile labs positive, diarrhea less today  -Vancomycin p.o. x7d  2023: no BM, KUB ordered    Class 2 severe obesity due to excess calories with serious comorbidity and body mass

## 2023-08-25 NOTE — CARE COORDINATION
Discharge planning was discussed with the Critical Care Interdisciplinary Team. He is not yet medically ready for discharge and may require another three days in the hospital. Discharge planning is pending the patient's clinical progress in the hospital.
the Patient Representative who was provided with the Choice of Provider and agrees with the Discharge Plan? The Patient and/or Patient Representative Agree with the Discharge Plan? Freedom of Choice list was provided with basic dialogue that supports the individualized plan of care/goals, treatment preferences, and shares the quality data associated with the providers?        Documentation for Discharge Appeal  Discharge Appealed by     Date notified by [facility] of appeal request:     Time notified by [facility] of appeal request:     Detailed Notice of Discharge given to:     Date Notice of Discharge given:     Time Notice of Discharge given:     Date records sent to [facility]     Time records sent to [address] [place] [address]     Date Notified of Outcome     Time Notified of Outcome     Outcome of appeal           LEX Staley 08/23/23 1:41 PM    [facility], [facility] Work   [address] [facility]

## 2023-08-25 NOTE — CONSENT
Informed Consent for Blood Component Transfusion Note    I have discussed with the patient the rationale for blood component transfusion; its benefits in treating or preventing fatigue, organ damage, or death; and its risk which includes mild transfusion reactions, rare risk of blood borne infection, or more serious but rare reactions. I have discussed the alternatives to transfusion, including the risk and consequences of not receiving transfusion. The patient had an opportunity to ask questions and had agreed to proceed with transfusion of blood components.     Electronically signed by Joanne Vazquez MD on 8/25/23 at 8:49 AM EDT

## 2023-08-26 LAB
ALBUMIN SERPL-MCNC: 2.9 G/DL (ref 3.5–5)
ANION GAP SERPL CALC-SCNC: 5 MMOL/L (ref 2–11)
APPEARANCE UR: CLEAR
BACTERIA SPEC CULT: NORMAL
BACTERIA URNS QL MICRO: NEGATIVE /HPF
BILIRUB UR QL: NEGATIVE
BUN SERPL-MCNC: 13 MG/DL (ref 6–23)
CALCIUM SERPL-MCNC: 8.4 MG/DL (ref 8.3–10.4)
CASTS URNS QL MICRO: ABNORMAL /LPF
CHLORIDE SERPL-SCNC: 109 MMOL/L (ref 101–110)
CO2 SERPL-SCNC: 29 MMOL/L (ref 21–32)
COLOR UR: ABNORMAL
CREAT SERPL-MCNC: 1 MG/DL (ref 0.8–1.5)
EPI CELLS #/AREA URNS HPF: ABNORMAL /HPF
ERYTHROCYTE [DISTWIDTH] IN BLOOD BY AUTOMATED COUNT: 13.8 % (ref 11.9–14.6)
GLUCOSE SERPL-MCNC: 97 MG/DL (ref 65–100)
GLUCOSE UR STRIP.AUTO-MCNC: NEGATIVE MG/DL
HCT VFR BLD AUTO: 24.8 % (ref 41.1–50.3)
HGB BLD-MCNC: 8.3 G/DL (ref 13.6–17.2)
HGB UR QL STRIP: NEGATIVE
KETONES UR QL STRIP.AUTO: ABNORMAL MG/DL
LEUKOCYTE ESTERASE UR QL STRIP.AUTO: ABNORMAL
MAGNESIUM SERPL-MCNC: 1.9 MG/DL (ref 1.8–2.4)
MCH RBC QN AUTO: 31.2 PG (ref 26.1–32.9)
MCHC RBC AUTO-ENTMCNC: 33.5 G/DL (ref 31.4–35)
MCV RBC AUTO: 93.2 FL (ref 82–102)
MUCOUS THREADS URNS QL MICRO: 0 /LPF
NITRITE UR QL STRIP.AUTO: NEGATIVE
NRBC # BLD: 0 K/UL (ref 0–0.2)
PH UR STRIP: 6.5 (ref 5–9)
PHOSPHATE SERPL-MCNC: 3 MG/DL (ref 2.5–4.5)
PLATELET # BLD AUTO: 187 K/UL (ref 150–450)
PMV BLD AUTO: 10.6 FL (ref 9.4–12.3)
POTASSIUM SERPL-SCNC: 3.5 MMOL/L (ref 3.5–5.1)
PROCALCITONIN SERPL-MCNC: <0.05 NG/ML (ref 0–0.49)
PROT UR STRIP-MCNC: NEGATIVE MG/DL
RBC # BLD AUTO: 2.66 M/UL (ref 4.23–5.6)
RBC #/AREA URNS HPF: ABNORMAL /HPF
SERVICE CMNT-IMP: NORMAL
SODIUM SERPL-SCNC: 143 MMOL/L (ref 133–143)
SP GR UR REFRACTOMETRY: 1.02 (ref 1–1.02)
URINE CULTURE IF INDICATED: ABNORMAL
UROBILINOGEN UR QL STRIP.AUTO: 1 EU/DL (ref 0.2–1)
WBC # BLD AUTO: 7 K/UL (ref 4.3–11.1)
WBC URNS QL MICRO: ABNORMAL /HPF

## 2023-08-26 PROCEDURE — 6370000000 HC RX 637 (ALT 250 FOR IP): Performed by: FAMILY MEDICINE

## 2023-08-26 PROCEDURE — 83735 ASSAY OF MAGNESIUM: CPT

## 2023-08-26 PROCEDURE — 87086 URINE CULTURE/COLONY COUNT: CPT

## 2023-08-26 PROCEDURE — 85027 COMPLETE CBC AUTOMATED: CPT

## 2023-08-26 PROCEDURE — 1100000000 HC RM PRIVATE

## 2023-08-26 PROCEDURE — 36415 COLL VENOUS BLD VENIPUNCTURE: CPT

## 2023-08-26 PROCEDURE — 2580000003 HC RX 258: Performed by: FAMILY MEDICINE

## 2023-08-26 PROCEDURE — 6360000002 HC RX W HCPCS: Performed by: FAMILY MEDICINE

## 2023-08-26 PROCEDURE — 82040 ASSAY OF SERUM ALBUMIN: CPT

## 2023-08-26 PROCEDURE — 84145 PROCALCITONIN (PCT): CPT

## 2023-08-26 PROCEDURE — 80048 BASIC METABOLIC PNL TOTAL CA: CPT

## 2023-08-26 PROCEDURE — 81001 URINALYSIS AUTO W/SCOPE: CPT

## 2023-08-26 PROCEDURE — 84100 ASSAY OF PHOSPHORUS: CPT

## 2023-08-26 RX ORDER — PANTOPRAZOLE SODIUM 40 MG/1
40 TABLET, DELAYED RELEASE ORAL
Status: DISCONTINUED | OUTPATIENT
Start: 2023-08-26 | End: 2023-08-27 | Stop reason: HOSPADM

## 2023-08-26 RX ORDER — POLYETHYLENE GLYCOL 3350 17 G/17G
17 POWDER, FOR SOLUTION ORAL ONCE
Status: COMPLETED | OUTPATIENT
Start: 2023-08-26 | End: 2023-08-26

## 2023-08-26 RX ADMIN — PANTOPRAZOLE SODIUM 40 MG: 40 TABLET, DELAYED RELEASE ORAL at 17:49

## 2023-08-26 RX ADMIN — PANTOPRAZOLE SODIUM 40 MG: 40 TABLET, DELAYED RELEASE ORAL at 09:24

## 2023-08-26 RX ADMIN — SUCRALFATE 1 G: 1 TABLET ORAL at 21:16

## 2023-08-26 RX ADMIN — ENOXAPARIN SODIUM 30 MG: 100 INJECTION SUBCUTANEOUS at 21:17

## 2023-08-26 RX ADMIN — SUCRALFATE 1 G: 1 TABLET ORAL at 17:49

## 2023-08-26 RX ADMIN — VANCOMYCIN HYDROCHLORIDE 125 MG: 5 INJECTION, POWDER, LYOPHILIZED, FOR SOLUTION INTRAVENOUS at 01:59

## 2023-08-26 RX ADMIN — SUCRALFATE 1 G: 1 TABLET ORAL at 05:29

## 2023-08-26 RX ADMIN — ENOXAPARIN SODIUM 30 MG: 100 INJECTION SUBCUTANEOUS at 09:25

## 2023-08-26 RX ADMIN — SODIUM CHLORIDE, PRESERVATIVE FREE 10 ML: 5 INJECTION INTRAVENOUS at 21:25

## 2023-08-26 RX ADMIN — POLYETHYLENE GLYCOL 3350 17 G: 17 POWDER, FOR SOLUTION ORAL at 10:33

## 2023-08-26 NOTE — PROGRESS NOTES
Hospitalist Progress Note   Admit Date:  2023 11:12 AM   Name:  Matt Granda   Age:  28 y.o. Sex:  male  :  1995   MRN:  [id number]   Room:  [facility]/01    Presenting/Chief Complaint: Loss of Consciousness and Hematemesis     Reason(s) for Admission: Hematemesis [K92.0]  Syncope and collapse [R55]  Acute upper GI bleed [K92.2]  Hypotension due to hypovolemia [I95.89, E86.1]     Hospital Course:   28 y.o. male with medical history of no significance who presented with awakened with rapid onset diarrhea and vomiting. He had eaten at [facility] and cook meals at home on the prior day. He awoke in feeling diaphoretic went to the bathroom was unable to make it to the toilet before he passed out and soiled himself. He noted bleeding with the vomiting. He does have a history of anterior approach surgery on the spine. He reports generalized resolution of diarrhea but feels very weak and tired. Subjective & 24hr Events:   Has some burring with urination. UA collected. Having constipation. Ordered laxative. Discussed with nurse, . If stable, hopefully home tomorrow. Assessment & Plan:   Hematemesis, gastric ulcer (high risk)  EGD with gastric ulcer above large vessel  -Consult gastroenterology  -Monitor in stepdown 24 hours  -Serial Hgb  -H. pylori labs  -Symptomatic management  2023: Hgb 8.9 > 8.3, recheck    Urinary tract infection  -UA with culture  -ceftriaxone if positive    Constipation  -PEG    Acute blood loss anemia  -Transfuse Hgb < 7    C. difficile infection   Ruled out    Class 2 severe obesity due to excess calories with serious comorbidity and body mass index (BMI) of 38.0 to 38.9 in adult  Increased risk of all cause mortality, complicating care  - healthy lifestyle at discharge        PT/OT evals and PPD needed/ordered? No  Diet:  ADULT DIET;  Regular  VTE prophylaxis: SCD's   Code status: Full Code      Non-peripheral Lines and Tubes (if present):

## 2023-08-26 NOTE — PLAN OF CARE
Problem: Discharge Planning  Goal: Discharge to home or other facility with appropriate resources  8/26/2023 0145 by Daniel Lovelace RN  Outcome: Progressing  Flowsheets (Taken 8/25/2023 1933)  Discharge to home or other facility with appropriate resources: Identify discharge learning needs (meds, wound care, etc)  8/25/2023 1434 by Ree Boyle RN  Outcome: Progressing  Flowsheets (Taken 8/25/2023 0800)  Discharge to home or other facility with appropriate resources:   Identify barriers to discharge with patient and caregiver   Arrange for needed discharge resources and transportation as appropriate     Problem: Pain  Goal: Verbalizes/displays adequate comfort level or baseline comfort level  8/26/2023 0145 by Daniel Lovelace RN  Outcome: Progressing  8/25/2023 1434 by Ree Boyle RN  Outcome: Progressing     Problem: Safety - Adult  Goal: Free from fall injury  8/26/2023 0145 by Daniel Lovelace RN  Outcome: Progressing  8/25/2023 1434 by Ree Boyle RN  Outcome: Progressing     Problem: ABCDS Injury Assessment  Goal: Absence of physical injury  8/26/2023 0145 by Daniel Lovelace RN  Outcome: Progressing  8/25/2023 1434 by Ree Boyle RN  Outcome: Progressing

## 2023-08-27 VITALS
SYSTOLIC BLOOD PRESSURE: 125 MMHG | HEART RATE: 79 BPM | RESPIRATION RATE: 16 BRPM | BODY MASS INDEX: 38.98 KG/M2 | HEIGHT: 73 IN | DIASTOLIC BLOOD PRESSURE: 63 MMHG | OXYGEN SATURATION: 98 % | TEMPERATURE: 98.5 F | WEIGHT: 294.1 LBS

## 2023-08-27 PROBLEM — A49.8 CLOSTRIDIOIDES DIFFICILE INFECTION: Status: RESOLVED | Noted: 2023-08-25 | Resolved: 2023-08-27

## 2023-08-27 PROBLEM — K92.0 HEMATEMESIS: Status: RESOLVED | Noted: 2023-08-23 | Resolved: 2023-08-27

## 2023-08-27 PROBLEM — F12.20 MARIJUANA DEPENDENCE (HCC): Chronic | Status: ACTIVE | Noted: 2023-08-27

## 2023-08-27 PROBLEM — K92.2 ACUTE UPPER GI BLEED: Status: RESOLVED | Noted: 2023-08-24 | Resolved: 2023-08-27

## 2023-08-27 LAB
ALBUMIN SERPL-MCNC: 3.2 G/DL (ref 3.5–5)
ANION GAP SERPL CALC-SCNC: 5 MMOL/L (ref 2–11)
BUN SERPL-MCNC: 14 MG/DL (ref 6–23)
CALCIUM SERPL-MCNC: 8.5 MG/DL (ref 8.3–10.4)
CHLORIDE SERPL-SCNC: 109 MMOL/L (ref 101–110)
CO2 SERPL-SCNC: 27 MMOL/L (ref 21–32)
CREAT SERPL-MCNC: 1.02 MG/DL (ref 0.8–1.5)
ERYTHROCYTE [DISTWIDTH] IN BLOOD BY AUTOMATED COUNT: 14.2 % (ref 11.9–14.6)
GLUCOSE SERPL-MCNC: 120 MG/DL (ref 65–100)
HCT VFR BLD AUTO: 25.2 % (ref 41.1–50.3)
HGB BLD-MCNC: 8.5 G/DL (ref 13.6–17.2)
MAGNESIUM SERPL-MCNC: 1.8 MG/DL (ref 1.8–2.4)
MCH RBC QN AUTO: 31.3 PG (ref 26.1–32.9)
MCHC RBC AUTO-ENTMCNC: 33.7 G/DL (ref 31.4–35)
MCV RBC AUTO: 92.6 FL (ref 82–102)
NRBC # BLD: 0 K/UL (ref 0–0.2)
PHOSPHATE SERPL-MCNC: 3.5 MG/DL (ref 2.5–4.5)
PLATELET # BLD AUTO: 217 K/UL (ref 150–450)
PMV BLD AUTO: 10.5 FL (ref 9.4–12.3)
POTASSIUM SERPL-SCNC: 3.4 MMOL/L (ref 3.5–5.1)
PROCALCITONIN SERPL-MCNC: <0.05 NG/ML (ref 0–0.49)
RBC # BLD AUTO: 2.72 M/UL (ref 4.23–5.6)
SODIUM SERPL-SCNC: 141 MMOL/L (ref 133–143)
WBC # BLD AUTO: 9.1 K/UL (ref 4.3–11.1)

## 2023-08-27 PROCEDURE — 84100 ASSAY OF PHOSPHORUS: CPT

## 2023-08-27 PROCEDURE — 82040 ASSAY OF SERUM ALBUMIN: CPT

## 2023-08-27 PROCEDURE — 84145 PROCALCITONIN (PCT): CPT

## 2023-08-27 PROCEDURE — 83735 ASSAY OF MAGNESIUM: CPT

## 2023-08-27 PROCEDURE — 80048 BASIC METABOLIC PNL TOTAL CA: CPT

## 2023-08-27 PROCEDURE — 6370000000 HC RX 637 (ALT 250 FOR IP): Performed by: FAMILY MEDICINE

## 2023-08-27 PROCEDURE — 36415 COLL VENOUS BLD VENIPUNCTURE: CPT

## 2023-08-27 PROCEDURE — 6360000002 HC RX W HCPCS: Performed by: FAMILY MEDICINE

## 2023-08-27 PROCEDURE — 85027 COMPLETE CBC AUTOMATED: CPT

## 2023-08-27 RX ORDER — FERROUS SULFATE 325(65) MG
325 TABLET ORAL 2 TIMES DAILY
Qty: 60 TABLET | Refills: 0 | Status: SHIPPED | OUTPATIENT
Start: 2023-08-27

## 2023-08-27 RX ORDER — PANTOPRAZOLE SODIUM 40 MG/1
40 TABLET, DELAYED RELEASE ORAL
Qty: 30 TABLET | Refills: 0 | Status: SHIPPED | OUTPATIENT
Start: 2023-08-27

## 2023-08-27 RX ADMIN — PANTOPRAZOLE SODIUM 40 MG: 40 TABLET, DELAYED RELEASE ORAL at 05:21

## 2023-08-27 RX ADMIN — SUCRALFATE 1 G: 1 TABLET ORAL at 05:21

## 2023-08-27 RX ADMIN — ENOXAPARIN SODIUM 30 MG: 100 INJECTION SUBCUTANEOUS at 08:07

## 2023-08-27 NOTE — DISCHARGE SUMMARY
Hospitalist Discharge Summary   Admit Date:  2023 11:12 AM   DC Note date: 2023  Name:  Jose Soares   Age:  28 y.o. Sex:  male  :  1995   MRN:  [id number]   Room:  [facility]  PCP:  No primary care provider on file. Presenting Complaint: Loss of Consciousness and Hematemesis     Initial Admission Diagnosis: Hematemesis [K92.0]  Syncope and collapse [R55]  Acute upper GI bleed [K92.2]  Hypotension due to hypovolemia [I95.89, E86.1]     Problem List for this Hospitalization (present on admission):    Principal Problem (Resolved):    Hematemesis  Active Problems:    Class 2 severe obesity due to excess calories with serious comorbidity and body mass index (BMI) of 38.0 to 38.9 in adult [facility])    Acute blood loss anemia    Marijuana dependence ([address])  Resolved Problems:    Acute upper GI bleed    Clostridioides difficile infection      Hospital Course:  27M PMHx obesity, marijuana use who presented after he awakened with rapid onset diarrhea and vomiting. He had eaten at [facility] and a home cooked meals on the prior day. He awoke in feeling diaphoretic went to the bathroom was unable to make it to the toilet before he passed out and soiled himself. He noted bleeding with the vomiting. He had a history of anterior approach surgery on the spine. He reported generalized resolution of diarrhea but felt very weak and tired. He was admitted for further evaluation and management. Gastroenterology was consulted. He underwent EGD and was found to have an ulcer overlying a blood vessel. The ulcer was repaired. He was monitored in stepdown unit and then on the medical floor for additional days. He showed no further evidence of bleeding. He was determined to be appropriate for discharge . Disposition: Home  Diet: ADULT DIET; Regular  Code Status: Full Code    Follow Ups:   Follow-up Information     [facility] - [address]  Schedule an appointment as   soon as
08/26/2023 11:00 AM    MUCUS 0 08/26/2023 11:00 AM        Microbiology:  Results       Procedure Component Value Units Date/Time    Culture, Urine [[id number]] Collected: 08/26/23 1100    Order Status: Completed Specimen: Urine, clean catch Updated: 08/27/23 0949     Special Requests NO SPECIAL REQUESTS        Culture NO GROWTH 1 DAY       Culture, Urine [[id number]] Collected: 08/23/23 2305    Order Status: Completed Specimen: Urine Updated: 08/26/23 1040     Special Requests --        NO SPECIAL REQUESTS  Reflexed from [id number]       Culture       <10,000 COLONIES/mL NORMAL SKIN KENYA ISOLATED          Culture, Stool [[id number]] Collected: 08/23/23 2153    Order Status: Completed Specimen: Stool Updated: 08/27/23 1054     Special Requests NO SPECIAL REQUESTS        Culture       E.coli isolated, organism sent to reference lab for Shiga toxin testing                  NO SALMONELLA OR SHIGELLA ISOLATED          Clostridium Difficile Toxin/Antigen [[id number]]  (Abnormal) Collected: 08/23/23 2150    Order Status: Completed Specimen: Stool Updated: 08/24/23 1256     [facility] Antigen       C. DIFFICILE [facility] ANTIGEN-POSITIVE           C difficile Toxin, EIA       C. DIFFICILE TOXIN-POSITIVE           Reflex NOT APPLICABLE        C Diff Toxin Interpretation       POSITIVE FOR TOXIGENIC C. DIFFICILE           CLINICAL CONSIDERATION       POSITIVE FOR TOXIGENIC C. DIFFICILE          Ova and Parasite Examination [[id number]] Collected: 08/23/23 2150    Order Status: Sent Specimen: Stool Updated: 08/23/23 2159            All Labs from Last 24 Hrs:  Recent Results (from the past 24 hour(s))   Phosphorus    Collection Time: 08/27/23  4:53 AM   Result Value Ref Range    Phosphorus 3.5 2.5 - 4.5 MG/DL   Albumin    Collection Time: 08/27/23  4:53 AM   Result Value Ref Range    Albumin 3.2 (L) 3.5 - 5.0 g/dL   Basic Metabolic Panel w/ Reflex to MG    Collection Time: 08/27/23  4:53 AM   Result Value Ref Range    Sodium 141 133 - 143

## 2023-08-27 NOTE — PLAN OF CARE
Problem: Discharge Planning  Goal: Discharge to home or other facility with appropriate resources  8/27/2023 0044 by Nathalie Mackay RN  Outcome: Progressing  Flowsheets (Taken 8/26/2023 1900)  Discharge to home or other facility with appropriate resources: Identify discharge learning needs (meds, wound care, etc)  8/26/2023 1802 by Ginna Luna RN  Outcome: Progressing     Problem: Pain  Goal: Verbalizes/displays adequate comfort level or baseline comfort level  8/27/2023 0044 by Nathalie Mackay RN  Outcome: Progressing  8/26/2023 1802 by Ginna Luna RN  Outcome: Progressing     Problem: Safety - Adult  Goal: Free from fall injury  8/27/2023 0044 by Nathalie Mackay RN  Outcome: Progressing  8/26/2023 1802 by Ginna Luna RN  Outcome: Progressing     Problem: ABCDS Injury Assessment  Goal: Absence of physical injury  8/27/2023 0044 by Nathalie Mackay RN  Outcome: Progressing  8/26/2023 1802 by Ginna Luna RN  Outcome: Progressing

## 2023-08-27 NOTE — PLAN OF CARE
Problem: Discharge Planning  Goal: Discharge to home or other facility with appropriate resources  8/27/2023 0959 by Rehan Salazar RN  Outcome: Progressing  8/27/2023 0044 by Nidia Payne RN  Outcome: Progressing  Flowsheets (Taken 8/26/2023 1900)  Discharge to home or other facility with appropriate resources: Identify discharge learning needs (meds, wound care, etc)     Problem: Pain  Goal: Verbalizes/displays adequate comfort level or baseline comfort level  8/27/2023 0959 by Rehan Salazar RN  Outcome: Progressing  8/27/2023 0044 by Nidia Payne RN  Outcome: Progressing     Problem: Safety - Adult  Goal: Free from fall injury  8/27/2023 0959 by Rehan Salazar RN  Outcome: Progressing  8/27/2023 0044 by Nidia Payne RN  Outcome: Progressing     Problem: ABCDS Injury Assessment  Goal: Absence of physical injury  8/27/2023 0959 by Rehan Salazar RN  Outcome: Progressing  8/27/2023 0044 by Nidia Payne RN  Outcome: Progressing

## 2023-08-28 LAB
BACTERIA SPEC CULT: NORMAL
C DIFF GDH STL QL: ABNORMAL
C DIFF TOX A+B STL QL IA: ABNORMAL
C DIFF TOXIN INTERPRETATION: ABNORMAL
CLINICAL CONSIDERATION: ABNORMAL
O+P SPEC MICRO: NORMAL
O+P STL CONC: NORMAL
REFLEX: ABNORMAL
SERVICE CMNT-IMP: NORMAL
SPECIMEN SOURCE: NORMAL

## 2023-08-30 LAB
BACTERIA SPEC CULT: NORMAL
SERVICE CMNT-IMP: NORMAL

## 2023-08-31 LAB — ORGANISM ID: ABNORMAL

## 2023-09-01 ENCOUNTER — TELEPHONE (OUTPATIENT)
Dept: GASTROENTEROLOGY | Age: 28
End: 2023-09-01

## 2023-09-01 DIAGNOSIS — B96.81 H PYLORI ULCER: Primary | ICD-10-CM

## 2023-09-01 DIAGNOSIS — K27.9 H PYLORI ULCER: Primary | ICD-10-CM

## 2023-09-01 RX ORDER — AMOXICILLIN 500 MG/1
1000 CAPSULE ORAL 2 TIMES DAILY
Qty: 56 CAPSULE | Refills: 0 | Status: SHIPPED | OUTPATIENT
Start: 2023-09-01 | End: 2023-09-15

## 2023-09-01 RX ORDER — CLARITHROMYCIN 500 MG/1
500 TABLET, COATED ORAL 2 TIMES DAILY
Qty: 28 TABLET | Refills: 0 | Status: SHIPPED | OUTPATIENT
Start: 2023-09-01 | End: 2023-09-15

## 2023-09-01 NOTE — TELEPHONE ENCOUNTER
Patient had duodenal ulcer and I did antral biopsies. The biopsies came back consistent with H. pylori. Please call the patient and and start treatment for the H. pylori. Also do a posttreatment 2-month stool H. pylori test or breath test to make sure we have eradication of the H. pylori.     Thank you

## 2023-09-03 LAB
BACTERIA ISLT: ABNORMAL
SPECIMEN SOURCE: ABNORMAL

## 2023-09-06 ENCOUNTER — TELEPHONE (OUTPATIENT)
Dept: GASTROENTEROLOGY | Age: 28
End: 2023-09-06

## 2023-09-06 LAB
BACTERIA SPEC CULT: NORMAL
SERVICE CMNT-IMP: NORMAL

## 2023-09-06 NOTE — TELEPHONE ENCOUNTER
Called patient with EGD results:    DIAGNOSIS        \"ANTRAL BIOPSIES\":  ACUTE AND CHRONIC GASTRITIS IN ASSOCIATION WITH   ORGANISMS MORPHOLOGICALLY CONSISTENT WITH HELICOBACTER PYLORI. Findings:     #1 antral ulcer with visible vessel status post epinephrine injection, gold probe cautery, and placement of 5 endoclips     2. Pinch biopsy taken of the gastric cavity work-up for possible H. pylori     Postoperative Diagnosis:     See above       Recommendations:     #1  Continue twice daily PPI     2. Based on the findings of an ulcer with visible vessel patient should be observed in a stepdown or ICU setting for the next 24 hours     3. Patient should remain in the hospital for approximately 3 days for close clinical observation     #4  Patient will need repeat upper endoscopy in 4 to 6 weeks     6. Follow-up biopsies to work-up for possible H. pylori     7. Avoid all nonsteroidal anti-inflammatory drugs as well as aspirin     8. Would initiate a clear liquid diet today     9. Serial H&H's and transfuse hemoglobin if less than 7    Reviewed results with patient. Instructed on treatment plan. Advised patient to / start taking Florastor to aid in preventing diarrhea associated with antibiotic use. Informed patient to finish medication then go by lab or our office to  a stool kit to be retested for H.pylori ideally 4 weeks after completing therapy. Patient is at work at present and wishes to call office tomorrow to review instructions when he can write all of this information down. Patient requesting assistance getting a PCP. NIEVES with [address] [phone] requesting that they assist patient.

## 2023-09-07 ENCOUNTER — TELEPHONE (OUTPATIENT)
Dept: GASTROENTEROLOGY | Age: 28
End: 2023-09-07

## 2023-09-07 NOTE — TELEPHONE ENCOUNTER
Received a message from [address] [place] [address] stating that patient's insurance is not with our system. They have alerted patient to call Hallie to get scheduled with PCP.

## (undated) DEVICE — GAUZE,SPONGE,4"X4",12PLY,WOVEN,NS,LF: Brand: MEDLINE

## (undated) DEVICE — YANKAUER,BULB TIP,W/O VENT,RIGID,STERILE: Brand: MEDLINE

## (undated) DEVICE — BLOCK BITE AD 60FR W/ VELC STRP ADDRESSES MOST PT AND

## (undated) DEVICE — SINGLE PORT MANIFOLD: Brand: NEPTUNE 2

## (undated) DEVICE — BIPOLAR ELECTROHEMOSTASIS CATHETER: Brand: GOLD PROBE

## (undated) DEVICE — CLIP: Brand: RESOLUTION 360™ ULTRA CLIP

## (undated) DEVICE — NEEDLE INJ 25GA P5MM SHFT L230CM SHTH DIA2.5MM S STL TEF

## (undated) DEVICE — AIRLIFE™ OXYGEN TUBING 7 FEET (2.1 M) CRUSH RESISTANT OXYGEN TUBING, VINYL TIPPED: Brand: AIRLIFE™

## (undated) DEVICE — ENDOSCOPIC KIT 1.1+ OP4 CA DE 2 GWN AAMI LEVEL 3

## (undated) DEVICE — NEEDLE SYR 18GA L1.5IN RED PLAS HUB S STL BLNT FILL W/O

## (undated) DEVICE — SYRINGE MED 3ML CLR PLAS STD N CTRL LUERLOCK TIP DISP

## (undated) DEVICE — FORCEPS BX L240CM JAW DIA2.8MM L CAP W/ NDL MIC MESH TOOTH

## (undated) DEVICE — KENDALL RADIOLUCENT FOAM MONITORING ELECTRODE RECTANGULAR SHAPE: Brand: KENDALL

## (undated) DEVICE — CANNULA NSL ORAL AD FOR CAPNOFLEX CO2 O2 AIRLFE

## (undated) DEVICE — SYRINGE, LUER SLIP, STERILE, 60ML: Brand: MEDLINE

## (undated) DEVICE — SYRINGE MED 10ML LUERLOCK TIP W/O SFTY DISP

## (undated) DEVICE — CONTAINER FORMALIN PREFILLED 10% NBF 60ML

## (undated) DEVICE — CONNECTOR TBNG OD5-7MM O2 END DISP